# Patient Record
Sex: FEMALE | Race: WHITE | NOT HISPANIC OR LATINO | Employment: FULL TIME | URBAN - METROPOLITAN AREA
[De-identification: names, ages, dates, MRNs, and addresses within clinical notes are randomized per-mention and may not be internally consistent; named-entity substitution may affect disease eponyms.]

---
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
## 2023-04-11 ENCOUNTER — HOSPITAL ENCOUNTER (EMERGENCY)
Facility: CLINIC | Age: 18
Discharge: HOME | End: 2023-04-11
Attending: PHYSICIAN ASSISTANT | Admitting: PHYSICIAN ASSISTANT
Payer: COMMERCIAL

## 2023-04-11 VITALS
SYSTOLIC BLOOD PRESSURE: 122 MMHG | TEMPERATURE: 99.1 F | HEART RATE: 118 BPM | WEIGHT: 117 LBS | OXYGEN SATURATION: 99 % | RESPIRATION RATE: 18 BRPM | DIASTOLIC BLOOD PRESSURE: 85 MMHG

## 2023-04-11 DIAGNOSIS — J02.0 STREPTOCOCCAL PHARYNGITIS: ICD-10-CM

## 2023-04-11 LAB — DEPRECATED S PYO AG THROAT QL EIA: POSITIVE

## 2023-04-11 PROCEDURE — 99283 EMERGENCY DEPT VISIT LOW MDM: CPT | Performed by: PHYSICIAN ASSISTANT

## 2023-04-11 PROCEDURE — 87880 STREP A ASSAY W/OPTIC: CPT | Performed by: PHYSICIAN ASSISTANT

## 2023-04-11 PROCEDURE — 87880 STREP A ASSAY W/OPTIC: CPT | Performed by: EMERGENCY MEDICINE

## 2023-04-11 RX ORDER — AZITHROMYCIN 250 MG/1
TABLET, FILM COATED ORAL
Qty: 6 TABLET | Refills: 0 | Status: SHIPPED | OUTPATIENT
Start: 2023-04-11 | End: 2023-04-16

## 2023-04-11 NOTE — ED TRIAGE NOTES
Pt c/o ST x 24 hours.      Triage Assessment     Row Name 04/11/23 9789       Triage Assessment (Adult)    Airway WDL WDL       Respiratory WDL    Respiratory WDL WDL       Cardiac WDL    Cardiac WDL WDL

## 2023-04-12 NOTE — ED PROVIDER NOTES
History     Chief Complaint   Patient presents with     Pharyngitis       HPI  Dawna Albright is a 17 year old female who presents to the emergency department complaining of a sore throat.  This began yesterday.  She has had low-grade fevers with it but no significant cough or nasal congestion.  She is able to swallow fluids okay.  No reported shortness of breath.        Allergies:  Allergies   Allergen Reactions     Amoxicillin Rash     Cefzil [Cefprozil] Nausea and Vomiting       Problem List:    There are no problems to display for this patient.       Past Medical History:    No past medical history on file.    Past Surgical History:    No past surgical history on file.    Family History:    No family history on file.    Social History:  Marital Status:  Single [1]  Social History     Tobacco Use     Smoking status: Never     Smokeless tobacco: Never        Medications:    azithromycin (ZITHROMAX) 250 MG tablet  Pseudoephedrine HCl (SUDAFED CHILDRENS PO)          Review of Systems   All other systems reviewed and are negative.        Physical Exam   BP: 122/85  Pulse: 118  Temp: 99.1  F (37.3  C)  Resp: 18  Weight: 53.1 kg (117 lb)  SpO2: 99 %      Physical Exam  Vitals and nursing note reviewed.   Constitutional:       General: She is not in acute distress.     Appearance: She is well-developed. She is not ill-appearing, toxic-appearing or diaphoretic.   HENT:      Head: Normocephalic and atraumatic.      Right Ear: Tympanic membrane normal.      Left Ear: Tympanic membrane normal.      Mouth/Throat:      Mouth: Mucous membranes are moist.      Pharynx: Uvula midline. Posterior oropharyngeal erythema present.      Tonsils: Tonsillar exudate present. No tonsillar abscesses.   Eyes:      Conjunctiva/sclera: Conjunctivae normal.      Pupils: Pupils are equal, round, and reactive to light.   Cardiovascular:      Rate and Rhythm: Regular rhythm. Tachycardia present.      Heart sounds: Normal heart sounds.    Pulmonary:      Effort: Pulmonary effort is normal. No respiratory distress.      Breath sounds: Normal breath sounds.   Lymphadenopathy:      Cervical: Cervical adenopathy present.   Skin:     General: Skin is warm and dry.   Neurological:      General: No focal deficit present.      Mental Status: She is alert and oriented to person, place, and time.   Psychiatric:         Mood and Affect: Mood normal.         Behavior: Behavior normal.         ED Course           Procedures        Results for orders placed or performed during the hospital encounter of 04/11/23 (from the past 24 hour(s))   Streptococcus A Rapid Scr w Reflx to PCR    Specimen: Throat; Swab   Result Value Ref Range    Group A Strep antigen Positive (A) Negative       Medications - No data to display    Assessments & Plan (with Medical Decision Making)  Dawan Albright is a 17 year old female who presented to the ED complaining of a sore throat that began yesterday.  She was tachycardic on arrival but otherwise normal vital signs.  Exam today revealed tonsillar exudates with erythema.  No signs of abscess.  Cervical adenopathy noted.  Rest of exam benign.  Patient screened for strep pharyngitis and this actually did come back positive.  I discussed treatment options with the patient and she preferred oral antibiotics.  Due to penicillin and cephalosporin allergy she will be prescribed azithromycin for treatment.  Advised use of ibuprofen or Tylenol as needed for pain, fluids, and rest.  She was provided instructions on when to return to the ED.  All questions answered and patient discharged home in suitable condition.     I have reviewed the nursing notes.    I have reviewed the findings, diagnosis, plan and need for follow up with the patient.        New Prescriptions    AZITHROMYCIN (ZITHROMAX) 250 MG TABLET    Take 2 tablets (500 mg) by mouth daily for 1 day, THEN 1 tablet (250 mg) daily for 4 days.       Final diagnoses:   Streptococcal  pharyngitis     Note: Chart documentation done in part with Dragon Voice Recognition software. Although reviewed after completion, some word and grammatical errors may remain.     4/11/2023   [facility] EMERGENCY DEPT     Dawna Dee PA-C  04/11/23 1948

## 2023-04-12 NOTE — DISCHARGE INSTRUCTIONS
Take full course of antibiotics as prescribed.  Use Tylenol or ibuprofen for pain and fever as needed.  Drink lots of fluids.  If you do have any worsening symptoms please return to the emergency department.    Thank you for choosing [facility]'s Emergency Department. It was a pleasure taking care of you today. If you have any questions, please call [phone].    Dawna Dee PA-C